# Patient Record
Sex: MALE | Race: WHITE | Employment: OTHER | URBAN - NONMETROPOLITAN AREA
[De-identification: names, ages, dates, MRNs, and addresses within clinical notes are randomized per-mention and may not be internally consistent; named-entity substitution may affect disease eponyms.]

---

## 2017-01-18 ENCOUNTER — OFFICE VISIT (OUTPATIENT)
Dept: PULMONOLOGY | Age: 52
End: 2017-01-18

## 2017-01-18 VITALS
RESPIRATION RATE: 22 BRPM | HEART RATE: 79 BPM | BODY MASS INDEX: 38.36 KG/M2 | WEIGHT: 315 LBS | DIASTOLIC BLOOD PRESSURE: 74 MMHG | HEIGHT: 76 IN | OXYGEN SATURATION: 94 % | SYSTOLIC BLOOD PRESSURE: 136 MMHG

## 2017-01-18 DIAGNOSIS — G47.33 OSA ON CPAP: Primary | ICD-10-CM

## 2017-01-18 DIAGNOSIS — Z99.89 OSA ON CPAP: Primary | ICD-10-CM

## 2017-01-18 PROCEDURE — 99213 OFFICE O/P EST LOW 20 MIN: CPT | Performed by: INTERNAL MEDICINE

## 2017-01-18 RX ORDER — VALSARTAN AND HYDROCHLOROTHIAZIDE 160; 12.5 MG/1; MG/1
1 TABLET, FILM COATED ORAL DAILY
COMMUNITY

## 2018-01-10 ENCOUNTER — OFFICE VISIT (OUTPATIENT)
Dept: PULMONOLOGY | Age: 53
End: 2018-01-10
Payer: COMMERCIAL

## 2018-01-10 VITALS
BODY MASS INDEX: 38.36 KG/M2 | OXYGEN SATURATION: 95 % | HEART RATE: 73 BPM | DIASTOLIC BLOOD PRESSURE: 72 MMHG | RESPIRATION RATE: 16 BRPM | SYSTOLIC BLOOD PRESSURE: 122 MMHG | WEIGHT: 315 LBS | HEIGHT: 76 IN

## 2018-01-10 DIAGNOSIS — E66.01 MORBID OBESITY WITH BMI OF 45.0-49.9, ADULT (HCC): ICD-10-CM

## 2018-01-10 DIAGNOSIS — Z99.89 OSA ON CPAP: Primary | ICD-10-CM

## 2018-01-10 DIAGNOSIS — G47.33 OSA ON CPAP: Primary | ICD-10-CM

## 2018-01-10 PROCEDURE — 99213 OFFICE O/P EST LOW 20 MIN: CPT | Performed by: INTERNAL MEDICINE

## 2018-01-10 RX ORDER — AMLODIPINE BESYLATE 5 MG/1
5 TABLET ORAL DAILY
COMMUNITY

## 2018-01-10 RX ORDER — VALSARTAN 160 MG/1
160 TABLET ORAL DAILY
COMMUNITY

## 2018-01-10 RX ORDER — ATORVASTATIN CALCIUM 40 MG/1
40 TABLET, FILM COATED ORAL DAILY
COMMUNITY

## 2018-01-10 RX ORDER — CARVEDILOL 6.25 MG/1
6.25 TABLET ORAL 2 TIMES DAILY WITH MEALS
COMMUNITY

## 2018-01-10 NOTE — COMMUNICATION BODY
Sleep Medicine Follow Up  Louisa Hamm MD    Dnaiele Arreola, 46 y.o.  [id number]     Nurses Notes   Analy Hobson is here for a 1 year f/u with download    Date of Last Visit  1/18/17      Scan of Download      Summary of PAP Download     Dates  12/11/17  -   1/9/18  Four Hour Compliance - 100 %. Total Hours -  385:22  Average Hours/Day -  12:52       AHI -  0.7    Original AHI -  109     Initial Study Date -  7/26/11  PAP Type -  cpap      Machine Type - s9      Pressure Settings -  14 cwp  Interface -  nasal    Provider  [][facility]  [][facility]  [][facility]  [][facility]         [x][facility] []Other:      Neck Circumference:    20.75    CC: KENNY    INTERVAL HISTORY         Daniele Arreola is a 46 y.o. old male who comes in for follow up regarding his obstructive sleep apnea. He is currently doing well using his positive airway pressure device. He is sleeping better at night with his machine and says he has less daytime sleepiness. The machine pressure settings are comfortable, the mask is reasonably comfortable and he is using the humidity. There have been no ER visits, hospital visits, any new issues or medication changes since the last visit here in sleep clinic. EXAM  Vitals -    /72   Pulse 73   Resp 16   Ht 6' 4\" (1.93 m)   Wt (!) 396 lb 6.4 oz (179.8 kg)   SpO2 95% Comment: room air at rest  BMI 48.25 kg/m²     Body mass index is 48.25 kg/m². Oxygen level - RA        Constitutional - BMI 48  Head  - Normocephalic and atraumatic. Mouth/Throat - Oropharynx is clear and moist.   Dentition - good  Mallampati Score - III (soft palate, base of uvula visible)  Cardiovascular - Normal rate, regular rhythm, normal heart sounds. No murmur heard. Pulmonary/Chest -  Effort normal and breath sounds normal.     ASSESSMENT  Obstructive Sleep Apnea (KENNY)  Good control with current therapy with significant improvement in clinical symptoms.   Excellent 4h compliance 100%, residual AHI 0.7  Has lost 21 lbs since last